# Patient Record
Sex: MALE
[De-identification: names, ages, dates, MRNs, and addresses within clinical notes are randomized per-mention and may not be internally consistent; named-entity substitution may affect disease eponyms.]

---

## 2018-03-14 ENCOUNTER — HOSPITAL ENCOUNTER (OUTPATIENT)
Dept: HOSPITAL 14 - H.ER | Age: 53
Setting detail: OBSERVATION
LOS: 1 days | Discharge: HOME | End: 2018-03-15
Attending: INTERNAL MEDICINE | Admitting: INTERNAL MEDICINE
Payer: COMMERCIAL

## 2018-03-14 DIAGNOSIS — E78.00: ICD-10-CM

## 2018-03-14 DIAGNOSIS — I10: ICD-10-CM

## 2018-03-14 DIAGNOSIS — E78.5: ICD-10-CM

## 2018-03-14 DIAGNOSIS — Z91.19: ICD-10-CM

## 2018-03-14 DIAGNOSIS — R07.89: Primary | ICD-10-CM

## 2018-03-14 DIAGNOSIS — E11.65: ICD-10-CM

## 2018-03-14 DIAGNOSIS — Z87.891: ICD-10-CM

## 2018-03-14 LAB
ALBUMIN SERPL-MCNC: 4.1 G/DL (ref 3.5–5)
ALBUMIN/GLOB SERPL: 1.3 {RATIO} (ref 1–2.1)
ALT SERPL-CCNC: 42 U/L (ref 21–72)
APTT BLD: 33.9 SECONDS (ref 25.6–37.1)
AST SERPL-CCNC: 31 U/L (ref 17–59)
BASOPHILS # BLD AUTO: 0 K/UL (ref 0–0.2)
BASOPHILS NFR BLD: 0.5 % (ref 0–2)
BUN SERPL-MCNC: 15 MG/DL (ref 9–20)
CALCIUM SERPL-MCNC: 9.4 MG/DL (ref 8.4–10.2)
D DIMER: 106 NG/MLDDU (ref 0–230)
EOSINOPHIL # BLD AUTO: 0.2 K/UL (ref 0–0.7)
EOSINOPHIL NFR BLD: 3.1 % (ref 0–4)
ERYTHROCYTE [DISTWIDTH] IN BLOOD BY AUTOMATED COUNT: 13.3 % (ref 11.5–14.5)
GFR NON-AFRICAN AMERICAN: > 60
HGB BLD-MCNC: 14.9 G/DL (ref 12–18)
INR PPP: 0.9 (ref 0.9–1.2)
LYMPHOCYTES # BLD AUTO: 2.9 K/UL (ref 1–4.3)
LYMPHOCYTES NFR BLD AUTO: 38.8 % (ref 20–40)
MCH RBC QN AUTO: 30.4 PG (ref 27–31)
MCHC RBC AUTO-ENTMCNC: 34 G/DL (ref 33–37)
MCV RBC AUTO: 89.4 FL (ref 80–94)
MONOCYTES # BLD: 0.5 K/UL (ref 0–0.8)
MONOCYTES NFR BLD: 6.7 % (ref 0–10)
NEUTROPHILS # BLD: 3.8 K/UL (ref 1.8–7)
NEUTROPHILS NFR BLD AUTO: 50.9 % (ref 50–75)
NRBC BLD AUTO-RTO: 0.1 % (ref 0–0)
PLATELET # BLD: 177 K/UL (ref 130–400)
PMV BLD AUTO: 8.8 FL (ref 7.2–11.7)
PROTHROMBIN TIME: 10.2 SECONDS (ref 9.8–13.1)
RBC # BLD AUTO: 4.9 MIL/UL (ref 4.4–5.9)
WBC # BLD AUTO: 7.4 K/UL (ref 4.8–10.8)

## 2018-03-14 PROCEDURE — 85378 FIBRIN DEGRADE SEMIQUANT: CPT

## 2018-03-14 PROCEDURE — 71046 X-RAY EXAM CHEST 2 VIEWS: CPT

## 2018-03-14 PROCEDURE — 80320 DRUG SCREEN QUANTALCOHOLS: CPT

## 2018-03-14 PROCEDURE — 85610 PROTHROMBIN TIME: CPT

## 2018-03-14 PROCEDURE — 84484 ASSAY OF TROPONIN QUANT: CPT

## 2018-03-14 PROCEDURE — 36415 COLL VENOUS BLD VENIPUNCTURE: CPT

## 2018-03-14 PROCEDURE — 80061 LIPID PANEL: CPT

## 2018-03-14 PROCEDURE — 93005 ELECTROCARDIOGRAM TRACING: CPT

## 2018-03-14 PROCEDURE — 99285 EMERGENCY DEPT VISIT HI MDM: CPT

## 2018-03-14 PROCEDURE — 83735 ASSAY OF MAGNESIUM: CPT

## 2018-03-14 PROCEDURE — 85025 COMPLETE CBC W/AUTO DIFF WBC: CPT

## 2018-03-14 PROCEDURE — 82948 REAGENT STRIP/BLOOD GLUCOSE: CPT

## 2018-03-14 PROCEDURE — 85730 THROMBOPLASTIN TIME PARTIAL: CPT

## 2018-03-14 PROCEDURE — 80053 COMPREHEN METABOLIC PANEL: CPT

## 2018-03-14 PROCEDURE — 84100 ASSAY OF PHOSPHORUS: CPT

## 2018-03-14 NOTE — ED PDOC
HPI: Chest Pain


Time Seen by Provider: 03/14/18 20:32


Chief Complaint (Nursing): Chest Pain


Chief Complaint (Provider): Chest Pain


History Per: Patient


History/Exam Limitations: no limitations


Onset/Duration Of Symptoms: Days (x3), Intermittent Episodes


Current Symptoms Are (Timing): Still Present


Quality: Pressure


Associated Symptoms: Dyspnea


Exacerbating Factors: Exertion


Alleviating Factors: Rest


Additional Complaint(s): 





Salomon Hill is a 52 year old male, with a past medical history of HTN, 

diabetes and hypercholesterolemia, who presents to the emergency department 

complaining of intermittent chest pain associated with shortness of breath 

onset for x3 days. Patient states pain is exacerbated by exertion and resolves 

with rest. Pain does not radiate but it's localized on the left side and 

substernal region, described as pressure-like. She denies any fever, chills or 

other medical complaints.





PMD: None provided. 





Past Medical History


Reviewed: Historical Data, Nursing Documentation, Vital Signs


Vital Signs: 


 Last Vital Signs











Temp  98.0 F   03/14/18 20:16


 


Pulse  80   03/14/18 22:55


 


Resp  16   03/14/18 20:16


 


BP  167/94 H  03/14/18 20:16


 


Pulse Ox  98   03/14/18 22:55














- Medical History


PMH: Diabetes, HTN, Hypercholesterolemia





- Surgical History


Surgical History: No Surg Hx





- Family History


Family History: States: Diabetes





- Social History


Current smoker - smoking cessation education provided: No


Alcohol: Occasional


Drugs: Denies





- Allergies


Allergies/Adverse Reactions: 


 Allergies











Allergy/AdvReac Type Severity Reaction Status Date / Time


 


No Known Allergies Allergy   Verified 03/14/18 20:15














Review of Systems


ROS Statement: Except As Marked, All Systems Reviewed And Found Negative


Constitutional: Negative for: Fever, Chills


Cardiovascular: Positive for: Chest Pain (pressure-like intermittent)


Respiratory: Positive for: Shortness of Breath





- Laboratory Results


Result Diagrams: 


 03/14/18 21:50





 03/14/18 21:50





- ECG


ECG Rhythm: Positive for: Normal QRS, Normal ST Segment, Sinus Rhythm (normal)


Rate: 80


O2 Sat by Pulse Oximetry: 98 (RA)


Pulse Ox Interpretation: Normal





Medical Decision Making


Medical Decision Making: 





Initial Impression: Chest pain with cardiac risk factors, will need 

hospitalization with enzymes to rule out ACS. Differential includes but not 

limited to PE, costochondritis, angina.





Initial Plan:





--EKG


--Alcohol serum


--CMP


--Magnesium


--Phosphorus


--Troponin I


--Troponin I Q8H


--Troponin I Q8H


--Urine dipstick


--CBC w/ differential


--D Dimer


--PTT


--PT


--Chest two views (PA/LAT) [RAD]


--Aspirin 325 mg PO


--Reevaluation








-Labs demonstrate hypoglycemia, otherwise no clinical significant abnormality. 

CXR negative for effusion and infiltrate. 





22:55


--Discussed case with Dr. Mejia for hospitalization. 








--------------------------------------------------------------------------------

-----------------   


Scribe Attestation:


Documented by Cosmo Stanford, acting as a scribe for Vee Hines MD





Provider Scribe Attestation:


All medical record entries made by the Scribe were at my direction and 

personally dictated by me. I have reviewed the chart and agree that the record 

accurately reflects my personal performance of the history, physical exam, 

medical decision making, and the department course for this patient. I have 

also personally directed, reviewed, and agree with the discharge instructions 

and disposition.





Disposition





- Disposition


Forms:  [facility] (English)

## 2018-03-15 VITALS
DIASTOLIC BLOOD PRESSURE: 78 MMHG | RESPIRATION RATE: 20 BRPM | OXYGEN SATURATION: 95 % | SYSTOLIC BLOOD PRESSURE: 137 MMHG | HEART RATE: 73 BPM | TEMPERATURE: 98.5 F

## 2018-03-15 LAB
HDLC SERPL-MCNC: 36 MG/DL (ref 30–70)
LDLC SERPL-MCNC: 149 MG/DL (ref 0–129)

## 2018-03-15 NOTE — CARD
--------------- APPROVED REPORT --------------





EKG Measurement

Heart [id number]

VT [id number]

[id number]

[id number]

[id number]



<Conclusion>

Normal sinus rhythm

Normal ECG

## 2018-03-15 NOTE — HP
HISTORY OF PRESENT ILLNESS:  Mr. Hill is a 52-year-old male who was

admitted via the Emergency Room because of chest pain, which started about

3 days prior to this presentation.  He indicates the pain is pressure-like

in nature and radiates to the left side of the arm.  He did not want to

come to the Emergency Room because pain was on and off for the past several

days.  He finally decided to have it checked out.



PAST MEDICAL HISTORY:  Hypertension, hyperlipidemia, and diabetes but has

been noncompliant to follow up _____.



FAMILY HISTORY:  Remarkable for mother who has hypertension.



SOCIAL HISTORY:  He denies alcohol or drug use, but indicated that he has

been on disability for the past several years because of back problems.



PHYSICAL EXAMINATION:

GENERAL:  The patient is alert and oriented.

VITAL SIGNS:  Blood pressure 167/94 with the pulse of 80, respiratory rate

is 18.  He is febrile.  O2 sat is 98% on room air.

SKIN:  Shows fair turgor.

HEENT:  Pupils are equal and reactive to light and accommodation.  JVP

flat.

LUNGS:  Clear.

HEART:  Regular.  No murmurs or gallop.  No chest wall tenderness.

ABDOMEN:  Soft and nontender, no organomegaly.

EXTREMITIES:  Appear unkempt with dirty fingers.

RECTAL AND GENITALIA:  Deferred.

CENTRAL NERVOUS SYSTEM:  Grossly intact.



LABORATORY DATA:  WBC 7.4, hemoglobin 14.9, and platelet count 177,000. 

Sodium 142, potassium 4.2, BUN 15, creatinine 0.6, and serum glucose 285. 

EKG; normal sinus rhythm, official report pending.  Chest x-ray; no acute

cardiopulmonary pathology noted.  Lipid profile; cholesterol 203,

triglycerides 155, and .  Troponin less than 0.012.



IMPRESSION:  Chest pain, one has to rule out acute coronary syndrome,

hypertension, hyperglycemia secondary to type 2 diabetes, uncontrolled.























PLAN:  The plan is cardiology evaluation.  If the patient is cleared by

Cardiology, we will probably discharge and have him follow up with his

primary care physician as an outpatient.  He is advised to comply with his

medications, which have been reordered including aspirin and Lovenox.





__________________________________________

Brian Mejia MD





DD:  03/15/2018 8:28:07

DT:  03/15/2018 8:32:08

[facility] # [id number]

## 2018-03-15 NOTE — CP.PCM.CON
History of Present Illness





- History of Present Illness


History of Present Illness: 





53 y/o male admitted with chest pains





Pt has had these pains daily x 3 days


usually comes on at rest


lasts 1 hour


no relation to exertion





no radiation





Troponins: neg


EKG: normal





PMH:


HTN


DM II


Hyperlipidemia





Past Patient History





- Past Medical History & Family History


Past Medical History?: Yes





- Past Social History


Smoking Status: Former Smoker





- CARDIAC


Hx Cardiac Disorders: Yes


Hx Hypercholesterolemia: Yes


Hx Hypertension: Yes





- PULMONARY


Hx Respiratory Disorders: No





- NEUROLOGICAL


Hx Neurological Disorder: No





- HEENT


Hx HEENT Problems: No





- RENAL


Hx Chronic Kidney Disease: No





- ENDOCRINE/METABOLIC


Hx Endocrine Disorders: Yes


Hx Diabetes Mellitus Type 2: Yes





- HEMATOLOGICAL/ONCOLOGICAL


Hx Blood Disorders: No





- INTEGUMENTARY


Hx Dermatological Problems: No





- MUSCULOSKELETAL/RHEUMATOLOGICAL


Hx Musculoskeletal Disorders: No


Hx Falls: No





- GASTROINTESTINAL


Hx Gastrointestinal Disorders: No





- GENITOURINARY/GYNECOLOGICAL


Hx Genitourinary Disorders: No





- PSYCHIATRIC


Hx Psychophysiologic Disorder: No


Hx Substance Use: No





- SURGICAL HISTORY


Hx Surgeries: No





- ANESTHESIA


Hx Anesthesia: No


Hx Anesthesia Reactions: No


Hx Malignant Hyperthermia: No


Has any member of the family had a problem w/ anesthesia?: No





Meds


Allergies/Adverse Reactions: 


 Allergies











Allergy/AdvReac Type Severity Reaction Status Date / Time


 


No Known Allergies Allergy   Verified 03/14/18 20:15














- Medications


Medications: 


 Current Medications





Aspirin (Ecotrin)  81 mg PO DAILY [facility]


   Last Admin: 03/15/18 09:00 Dose:  81 mg


Ibuprofen (Motrin Tab)  600 mg PO Q6 PRN


   PRN Reason: Pain, moderate (4-7)


Metformin HCl (Glucophage)  1,000 mg PO BID [facility]


   Last Admin: 03/15/18 09:00 Dose:  1,000 mg











Physical Exam





- Constitutional


Appears: Well





- Neck Exam


Neck exam: Positive for: Normal Inspection





- Respiratory Exam


Respiratory Exam: NORMAL BREATHING PATTERN





- Cardiovascular Exam


Cardiovascular Exam: REGULAR RHYTHM





Results





- Vital Signs


Recent Vital Signs: 


 Last Vital Signs











Temp  98 F   03/15/18 08:00


 


Pulse  60   03/15/18 08:00


 


Resp  18   03/15/18 08:00


 


BP  120/71   03/15/18 08:00


 


Pulse Ox  98   03/15/18 08:00














- Labs


Result Diagrams: 


 03/14/18 21:50





 03/14/18 21:50


Labs: 


 Laboratory Results - last 24 hr











  03/14/18 03/14/18 03/14/18





  21:36 21:50 21:50


 


WBC    7.4


 


RBC    4.90


 


Hgb    14.9


 


Hct    43.8


 


MCV    89.4


 


MCH    30.4


 


MCHC    34.0


 


RDW    13.3


 


Plt Count    177


 


MPV    8.8


 


Neut % (Auto)    50.9


 


Lymph % (Auto)    38.8


 


Mono % (Auto)    6.7


 


Eos % (Auto)    3.1


 


Baso % (Auto)    0.5


 


Neut # (Auto)    3.8


 


Lymph # (Auto)    2.9


 


Mono # (Auto)    0.5


 


Eos # (Auto)    0.2


 


Baso # (Auto)    0.0


 


PT   


 


INR   


 


APTT   


 


D-Dimer, Quantitative   


 


Sodium   142 


 


Potassium   4.2 


 


Chloride   99 


 


Carbon Dioxide   27 


 


Anion Gap   20 


 


BUN   15 


 


Creatinine   0.6 L 


 


Est GFR ( Amer)   > 60 


 


Est GFR (Non-Af Amer)   > 60 


 


POC Glucose (mg/dL)  273 H  


 


Random Glucose   285 H 


 


Calcium   9.4 


 


Phosphorus   3.3 


 


Magnesium   1.7 


 


Total Bilirubin   0.3 


 


AST   31 


 


ALT   42 


 


Alkaline Phosphatase   98 


 


Troponin I   < 0.0120 


 


Total Protein   7.3 


 


Albumin   4.1 


 


Globulin   3.2 


 


Albumin/Globulin Ratio   1.3 


 


Triglycerides   


 


Cholesterol   


 


LDL Cholesterol Direct   


 


HDL Cholesterol   


 


Alcohol, Quantitative   < 10 














  03/14/18 03/15/18 03/15/18





  21:50 05:21 06:00


 


WBC   


 


RBC   


 


Hgb   


 


Hct   


 


MCV   


 


MCH   


 


MCHC   


 


RDW   


 


Plt Count   


 


MPV   


 


Neut % (Auto)   


 


Lymph % (Auto)   


 


Mono % (Auto)   


 


Eos % (Auto)   


 


Baso % (Auto)   


 


Neut # (Auto)   


 


Lymph # (Auto)   


 


Mono # (Auto)   


 


Eos # (Auto)   


 


Baso # (Auto)   


 


PT  10.2  


 


INR  0.9  


 


APTT  33.9  


 


D-Dimer, Quantitative  106  


 


Sodium   


 


Potassium   


 


Chloride   


 


Carbon Dioxide   


 


Anion Gap   


 


BUN   


 


Creatinine   


 


Est GFR ( Amer)   


 


Est GFR (Non-Af Amer)   


 


POC Glucose (mg/dL)   219 H 


 


Random Glucose   


 


Calcium   


 


Phosphorus   


 


Magnesium   


 


Total Bilirubin   


 


AST   


 


ALT   


 


Alkaline Phosphatase   


 


Troponin I    < 0.0120


 


Total Protein   


 


Albumin   


 


Globulin   


 


Albumin/Globulin Ratio   


 


Triglycerides   


 


Cholesterol   


 


LDL Cholesterol Direct   


 


HDL Cholesterol   


 


Alcohol, Quantitative   














  03/15/18





  06:00


 


WBC 


 


RBC 


 


Hgb 


 


Hct 


 


MCV 


 


MCH 


 


MCHC 


 


RDW 


 


Plt Count 


 


MPV 


 


Neut % (Auto) 


 


Lymph % (Auto) 


 


Mono % (Auto) 


 


Eos % (Auto) 


 


Baso % (Auto) 


 


Neut # (Auto) 


 


Lymph # (Auto) 


 


Mono # (Auto) 


 


Eos # (Auto) 


 


Baso # (Auto) 


 


PT 


 


INR 


 


APTT 


 


D-Dimer, Quantitative 


 


Sodium 


 


Potassium 


 


Chloride 


 


Carbon Dioxide 


 


Anion Gap 


 


BUN 


 


Creatinine 


 


Est GFR ( Amer) 


 


Est GFR (Non-Af Amer) 


 


POC Glucose (mg/dL) 


 


Random Glucose 


 


Calcium 


 


Phosphorus 


 


Magnesium 


 


Total Bilirubin 


 


AST 


 


ALT 


 


Alkaline Phosphatase 


 


Troponin I 


 


Total Protein 


 


Albumin 


 


Globulin 


 


Albumin/Globulin Ratio 


 


Triglycerides  155 H


 


Cholesterol  203 H


 


LDL Cholesterol Direct  149 H


 


HDL Cholesterol  36


 


Alcohol, Quantitative 














Assessment & Plan


(1) Atypical chest pain


Assessment and Plan: 


Non cardiac chest pain


pt may be d/c ed


Status: Acute

## 2018-03-15 NOTE — RAD
HISTORY:

chest pain  



COMPARISON:

No prior.



TECHNIQUE:

Chest PA and lateral



FINDINGS:



LUNGS:

No acute infiltrate bilaterally.  Linear atelectasis or fibrosis seen 

minimally at the left base. Borderline left hemidiaphragm elevation.



PLEURA:

No significant pleural effusion identified. No pneumothorax apparent.



CARDIOVASCULAR:

Normal.



OSSEOUS STRUCTURES:

No significant abnormalities.



VISUALIZED UPPER ABDOMEN:

Normal.



OTHER FINDINGS:

None.



IMPRESSION:

Borderline left hemidiaphragm elevation. Trace linear atelectasis or 

fibrosis left base. No acute infiltrate or pleural effusion 

bilaterally.

## 2018-03-18 NOTE — CP.PCM.DIS
Provider





- Provider


Date of Admission: 


03/14/18 22:53





Attending physician: 


Brian Mejia MD





Time Spent in preparation of Discharge (in minutes): 30





Diagnosis





- Discharge Diagnosis


(1) Hypertension


Status: Acute   





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


Status: Acute   





(3) Atypical chest pain


Status: Acute   





Hospital Course





- Lab Results


Lab Results: 


 Most Recent Lab Values











WBC  7.4 K/uL (4.8-10.8)   03/14/18  21:50    


 


RBC  4.90 Mil/uL (4.40-5.90)   03/14/18  21:50    


 


Hgb  14.9 g/dL (12.0-18.0)   03/14/18  21:50    


 


Hct  43.8 % (35.0-51.0)   03/14/18  21:50    


 


MCV  89.4 fl (80.0-94.0)   03/14/18  21:50    


 


MCH  30.4 pg (27.0-31.0)   03/14/18  21:50    


 


MCHC  34.0 g/dL (33.0-37.0)   03/14/18  21:50    


 


RDW  13.3 % (11.5-14.5)   03/14/18  21:50    


 


Plt Count  177 K/uL (130-400)   03/14/18  21:50    


 


MPV  8.8 fl (7.2-11.7)   03/14/18  21:50    


 


Neut % (Auto)  50.9 % (50.0-75.0)   03/14/18  21:50    


 


Lymph % (Auto)  38.8 % (20.0-40.0)   03/14/18  21:50    


 


Mono % (Auto)  6.7 % (0.0-10.0)   03/14/18  21:50    


 


Eos % (Auto)  3.1 % (0.0-4.0)   03/14/18  21:50    


 


Baso % (Auto)  0.5 % (0.0-2.0)   03/14/18  21:50    


 


Neut # (Auto)  3.8 K/uL (1.8-7.0)   03/14/18  21:50    


 


Lymph # (Auto)  2.9 K/uL (1.0-4.3)   03/14/18  21:50    


 


Mono # (Auto)  0.5 K/uL (0.0-0.8)   03/14/18  21:50    


 


Eos # (Auto)  0.2 K/uL (0.0-0.7)   03/14/18  21:50    


 


Baso # (Auto)  0.0 K/uL (0.0-0.2)   03/14/18  21:50    


 


PT  10.2 Seconds (9.8-13.1)   03/14/18  21:50    


 


INR  0.9  (0.9-1.2)   03/14/18  21:50    


 


APTT  33.9 Seconds (25.6-37.1)   03/14/18  21:50    


 


D-Dimer, Quantitative  106 ng/mlDDU (0-230)   03/14/18  21:50    


 


Sodium  142 mmol/l (132-148)   03/14/18  21:50    


 


Potassium  4.2 MMOL/L (3.6-5.0)   03/14/18  21:50    


 


Chloride  99 mmol/L ()   03/14/18  21:50    


 


Carbon Dioxide  27 mmol/L (22-30)   03/14/18  21:50    


 


Anion Gap  20  (10-20)   03/14/18  21:50    


 


BUN  15 mg/dl (9-20)   03/14/18  21:50    


 


Creatinine  0.6 mg/dl (0.8-1.5)  L  03/14/18  21:50    


 


Est GFR ( Amer)  > 60   03/14/18  21:50    


 


Est GFR (Non-Af Amer)  > 60   03/14/18  21:50    


 


POC Glucose (mg/dL)  308 mg/dL ()  H  03/15/18  11:26    


 


Random Glucose  285 mg/dL ()  H  03/14/18  21:50    


 


Calcium  9.4 mg/dL (8.4-10.2)   03/14/18  21:50    


 


Phosphorus  3.3 mg/dl (2.5-4.5)   03/14/18  21:50    


 


Magnesium  1.7 MG/DL (1.6-2.3)   03/14/18  21:50    


 


Total Bilirubin  0.3 mg/dl (0.2-1.3)   03/14/18  21:50    


 


AST  31 U/L (17-59)   03/14/18  21:50    


 


ALT  42 U/L (21-72)   03/14/18  21:50    


 


Alkaline Phosphatase  98 U/L ()   03/14/18  21:50    


 


Troponin I  < 0.0120 ng/mL (0.00-0.120)   03/15/18  14:01    


 


Total Protein  7.3 G/DL (6.3-8.2)   03/14/18  21:50    


 


Albumin  4.1 g/dL (3.5-5.0)   03/14/18  21:50    


 


Globulin  3.2 gm/dL (2.2-3.9)   03/14/18  21:50    


 


Albumin/Globulin Ratio  1.3  (1.0-2.1)   03/14/18  21:50    


 


Triglycerides  155 mg/DL (0-149)  H  03/15/18  06:00    


 


Cholesterol  203 mg/dL (0-199)  H  03/15/18  06:00    


 


LDL Cholesterol Direct  149 mg/dL (0-129)  H  03/15/18  06:00    


 


HDL Cholesterol  36 MG/DL (30-70)   03/15/18  06:00    


 


Alcohol, Quantitative  < 10 mg/dl (0-10)   03/14/18  21:50    














- Hospital Course


Hospital Course: 





CHEST PAINS RESOLVED


ALL WORKUP NON-REVEALING





Discharge Exam





- Head Exam


Head Exam: ATRAUMATIC, NORMAL INSPECTION, NORMOCEPHALIC





- Eye Exam


Eye Exam: EOMI, Normal appearance, PERRL


Pupil Exam: NORMAL ACCOMODATION, PERRL





- GI/Abdominal Exam


GI & Abdominal Exam: Normal Bowel Sounds





- Rectal Exam


Rectal Exam: NORMAL INSPECTION





- Neurological Exam


Neurological exam: Alert, CN II-XII Intact, Normal Gait, Oriented x3, Reflexes 

Normal





- Psychiatric Exam


Psychiatric exam: Normal Affect, Normal Mood





- Skin


Skin Exam: Dry, Intact, Normal Color, Warm





Discharge Plan





- Discharge Medications


Prescriptions: 


Metformin HCl [Glucophage] 1,000 mg PO BID #60 tablet





- Follow Up Plan


Condition: FAIR


Disposition: HOME/ ROUTINE


Patient education suggested?: Yes


Instructions:  Chest Pain That Is Not Caused by the Heart (DC)


Additional Instructions: 


follow up with pmd in 1 week


Referrals: 


Pardeep Walsh MD [Staff Provider] -